# Patient Record
(demographics unavailable — no encounter records)

---

## 2025-01-03 NOTE — HISTORY OF PRESENT ILLNESS
[Born at ___ Wks Gestation] : The patient was born at [unfilled] weeks gestation [] : via normal spontaneous vaginal delivery [[facility]] : [facility] [(1) _____] : [unfilled] [(5) _____] : [unfilled] [None] : There were no delivery complications [BW: _____] : weight of [unfilled] [Length: _____] : length of [unfilled] [HC: _____] : head circumference of [unfilled] [DW: _____] : Discharge weight was [unfilled] [Age: ___] : [unfilled] year old mother [G: ___] : G [unfilled] [P: ___] : P [unfilled] [Significant Hx: ____] : The mother's  medical history is significant for [unfilled] [GBS] : GBS positive [Rubella (Immune)] : Rubella immune [Antibiotics: ______] : antibiotics ([unfilled]) [TcB: _____] : Transcutaneous Bilirubin [unfilled] mg/dL [Phototherapy Threshold: _____] : Phototherapy level per Bilitool [unfilled] (mg/dL) [Nirsevimab Given] : Nirsevimab given  [Breast milk] : breast milk [Expressed Breast milk ___oz/feed] : [unfilled] oz of expressed breast milk per feed [Formula ___ oz/feed] : [unfilled] oz of formula per feed [Hours between feeds ___] : Child is fed every [unfilled] hours [Normal] : Normal [___ voids per day] : [unfilled] voids per day [Frequency of stools: ___] : Frequency of stools: [unfilled]  stools [per day] : per day. [Yellow] : yellow [Seedy] : seedy [In Bassinet/Crib] : sleeps in bassinet/crib [On back] : sleeps on back [Co-sleeping] : co-sleeping [Loose bedding, pillow, toys, and/or bumpers in crib] : loose bedding, pillow, toys, and/or bumpers in crib [Pacifier] : Uses pacifier [No] : No cigarette smoke exposure [Water heater temperature set at <120 degrees F] : Water heater temperature set at <120 degrees F [Rear facing car seat in back seat] : Rear facing car seat in back seat [Carbon Monoxide Detectors] : Carbon monoxide detectors at home [Smoke Detectors] : Smoke detectors at home. [Yes] : Yes [HepBsAG] : HepBsAg negative [HIV] : HIV negative [VDRL/RPR (Reactive)] : VDRL/RPR nonreactive [] : negative [de-identified] : 24 [FreeTextEntry9] : B+ [de-identified] : Transcutaneous bili at 48 HOL was 9.3 (PT 14.0). [FreeTextEntry8] : 38.2 week AGA male born via  to a 31 year old  mother. Maternal history includes sickle cell trait, asthma (no history of intubations, no inhaler use since second trimester, and no inhaler at home), SMA carrier, no FOB results, family history of CHD, normal fetal echo (non-urgent post aristeo echo recommended, can be done outpatient). Prenatal labs are all negative except GBS is positive, inadequately treated with ampicillinx1 less than four hours prior to delivery.  [Vitamins ___] : Patient takes no vitamins [Exposure to electronic nicotine delivery system] : No exposure to electronic nicotine delivery system [Hepatitis B Vaccine Given] : Hepatitis B vaccine not given [FreeTextEntry1] : Patient is a 5do M, no significant PMHx, presents for  checkup and to establish care. Mom states that patient is feeding, voiding and stooling per baseline. Maternal great grandfather has a h/o CHD, patient prenatal echo was normal, will follow-up with cardiologist next week.     SDOH Screening Questionnaire  SDOH (Social Determinants of Health) Questionnaire: 1. Housing: Do you worry that in the upcoming months, your family, or child, may not have a safe or stable place to live? No  2. Food security: Within the last 12 months, did the food you bought not last and you did not have money to buy more? No  3. Community: Do you need help getting public benefits like food stamps or WIC? Yes 4. Transportation: Does your child have chronic medical condition and therefore struggle with transportation to attend medical appointments? No  5. Healthcare Access: Do you need help getting health or dental insurance? No   Result: Positive Screen. Resource packet provided. Social work consulted logged.

## 2025-01-03 NOTE — DISCUSSION/SUMMARY
[ Transition] :  transition [ Care] :  care [Nutritional Adequacy] : nutritional adequacy [Parental Well-Being] : parental well-being [Safety] : safety [Normal Growth] : growth [Normal Development] : developmental [No Elimination Concerns] : elimination [Continue Regimen] : feeding [No Skin Concerns] : skin [Normal Sleep Pattern] : sleep [None] : no known medical problems [Anticipatory Guidance Given] : Anticipatory guidance addressed as per the history of present illness section [Hepatitis B In Hospital] : Hepatitis B not administered while in the hospital [No Vaccines] : no vaccines needed [No Medications] : ~He/She~ is not on any medications [Parent/Guardian] : Parent/Guardian [FreeTextEntry6] : Beyfortus  [FreeTextEntry1] : 5 day old male born FT via  presenting for HCM. Maternal prenatal labs positive for GBS with inadequate treatment . Growth and development normal. Has regained birthweight. PE remarkable for erythema toxicum, R lateral subconjunctival hemorrhage present from birth, scleral icterus and erythema to the L scrotum. TCB in office today was 8.3, within normal limits and downtrending. Maternal depression screen passed. CCHD and hearing screens passed. NBS pending. Immunizations UTD.  - Routine  care & anticipatory guidance given - Continue ad jagdeep feeds at least every 3 hours - Polyvisol as prescribed - Follow up NBS (maternal sickle cell trait) - RTC 10 days for weight check and prn - RTC for 1 month HCM and prn - Discussed STRICT precautions for seeking immediate medical attention including but not limited to fever of 100.4F or more, yellowing or increased yellowing of skin or eyes, redness, discharge or foul odor from umbilical stump, poor feeding, lethargy or decreased responsiveness, fast or labored breathing, less than 5 wet diapers daily, rash or any other concerning sign or symptom.  Family Hx of CHD: - Follow up with Dr. Graf as scheduled on .   Diaper Rash - Alternate A&D and desitin cream every diaper change  Caretaker expressed understanding of the plan and agrees. All questions were answered.

## 2025-01-03 NOTE — PHYSICAL EXAM
[Alert] : alert [Normocephalic] : normocephalic [Flat Open Anterior McIntyre] : flat open anterior fontanelle [Icteric sclera] : icteric sclera [PERRL] : PERRL [Red Reflex Bilateral] : red reflex bilateral [Normally Placed Ears] : normally placed ears [Auricles Well Formed] : auricles well formed [Clear Tympanic membranes] : clear tympanic membranes [Light reflex present] : light reflex present [Bony structures visible] : bony structures visible [Patent Auditory Canal] : patent auditory canal [Nares Patent] : nares patent [Palate Intact] : palate intact [Uvula Midline] : uvula midline [Supple, full passive range of motion] : supple, full passive range of motion [Symmetric Chest Rise] : symmetric chest rise [Clear to Auscultation Bilaterally] : clear to auscultation bilaterally [Regular Rate and Rhythm] : regular rate and rhythm [S1, S2 present] : S1, S2 present [+2 Femoral Pulses] : +2 femoral pulses [Soft] : soft [Bowel Sounds] : bowel sounds present [Umbilical Stump Dry, Clean, Intact] : umbilical stump dry, clean, intact [Normal external genitailia] : normal external genitalia [Central Urethral Opening] : central urethral opening [Testicles Descended Bilaterally] : testicles descended bilaterally [Patent] : patent [Normally Placed] : normally placed [No Abnormal Lymph Nodes Palpated] : no abnormal lymph nodes palpated [Symmetric Flexed Extremities] : symmetric flexed extremities [Startle Reflex] : startle reflex present [Suck Reflex] : suck reflex present [Rooting] : rooting reflex present [Palmar Grasp] : palmar grasp present [Plantar Grasp] : plantar reflex present [Symmetric Karlee] : symmetric Left Hand [Jaundice] : jaundice [Dermal Melanocytosis] : Dermal Melanocytosis [Erythema Toxicum] : erythema toxicum [Acute Distress] : no acute distress [Discharge] : no discharge [Palpable Masses] : no palpable masses [Murmurs] : no murmurs [Tender] : nontender [Distended] : not distended [Hepatomegaly] : no hepatomegaly [Splenomegaly] : no splenomegaly [Rivera-Ortolani] : negative Rivera-Ortolani [Spinal Dimple] : no spinal dimple [Tuft of Hair] : no tuft of hair [FreeTextEntry5] : R lateral subconjunctival hemorrhage [FreeTextEntry6] : Erythema to L dorsal scrotum

## 2025-01-21 NOTE — DISCUSSION/SUMMARY
[FreeTextEntry1] : In summary, SYLVAIN is a 23 day old male here for cardiac evaluation. His physical exam is normal. His echocardiogram shows PFO with otherwise normal intracardiac anatomy with good biventricular systolic function and no effusion. Given these results and his clinical presentation, I provided reassurance and explained that SYLVAIN has a structurally normal heart; we discussed that small PFO is considered a normal variant. No further cardiac work up or follow up is necessary at this time. However, I would recommend re-evaluation if there are any new or worsening symptoms in the future.   Plan: - Return as needed for any new and/or worsening symptoms. - No activity restrictions. - No SBE prophylaxis.     Please do not hesitate to contact me if you have any questions.   Carlos Graf MD, MS, FAAP, FACC Attending Physician, Pediatric Cardiology [facility] [address] [place] [address], [address] [place] [address] Office: [phone] Fax: [phone] Email: [email or web address]     I have spent 60 minutes of time on the encounter excluding separately reported services.

## 2025-01-21 NOTE — REVIEW OF SYSTEMS
[Nl] : no feeding issues at this time. [Acting Fussy] : not acting ~L fussy [Fever] : no fever [Wgt Loss (___ Lbs)] : no recent weight loss [Pallor] : not pale [Discharge] : no discharge [Redness] : no redness [Nasal Discharge] : no nasal discharge [Nasal Stuffiness] : no nasal congestion [Stridor] : no stridor [Cyanosis] : no cyanosis [Edema] : no edema [Diaphoresis] : not diaphoretic [Tachypnea] : not tachypneic [Wheezing] : no wheezing [Cough] : no cough [Being A Poor Eater] : not a poor eater [Vomiting] : no vomiting [Diarrhea] : no diarrhea [Decrease In Appetite] : appetite not decreased [Fainting (Syncope)] : no fainting [Dec Consciousness] :  no decrease in consciousness [Seizure] : no seizures [Hypotonicity (Flaccid)] : not hypotonic [Refusal to Bear Wgt] : normal weight bearing [Puffy Hands/Feet] : no hand/feet puffiness [Rash] : no rash [Hemangioma] : no hemangioma [Jaundice] : no jaundice [Wound problems] : no wound problems [Bruising] : no tendency for easy bruising [Swollen Glands] : no lymphadenopathy [Enlarged [place]] : the fontanelle was not enlarged [Hoarse Cry] : no hoarse cry [Failure To Thrive] : no failure to thrive [Penis Circumcised] : not circumcised [Undescended Testes] : no undescended testicle [Ambiguous Genitals] : genitals not ambiguous [Dec Urine Output] : no oliguria

## 2025-01-21 NOTE — CARDIOLOGY SUMMARY
[Today's Date] : [unfilled] [FreeTextEntry2] : Small PFO (L to R flow), normal variant. Otherwise totally normal.

## 2025-01-21 NOTE — HISTORY OF PRESENT ILLNESS
[FreeTextEntry1] : Dear Dr. SAVANNA CHURCHILL,   I had the pleasure of seeing your patient, SYLVAIN FREEDMAN, in my office today, 01/21/2025. As you know, he is a 23 day old male referred to pediatric cardiology due to family history of possible CHD.   Mom has a history of murmur that resolved in adulthood. Fetal echo was normal. Patient born at 38 weeks GA. Feeding well, normal weight gain. No tachypnea or sweating with feeds. No cyanosis. No fevers or URI symptoms. No family history of sudden/unexplained death. No family member with a known genetic syndrome.

## 2025-01-21 NOTE — FAMILY HISTORY
[TextEntry] : As above. No sudden unexplained deaths or early deaths. No first degree relatives with early MIs.

## 2025-01-21 NOTE — REASON FOR VISIT
[Initial Consultation] : an initial consultation for [FreeTextEntry3] : Family history of possible CHD